# Patient Record
Sex: FEMALE | NOT HISPANIC OR LATINO | URBAN - METROPOLITAN AREA
[De-identification: names, ages, dates, MRNs, and addresses within clinical notes are randomized per-mention and may not be internally consistent; named-entity substitution may affect disease eponyms.]

---

## 2020-06-12 ENCOUNTER — ALLIED HEALTH/NURSE VISIT (OUTPATIENT)
Dept: PHARMACY | Facility: CLINIC | Age: 50
End: 2020-06-12
Payer: COMMERCIAL

## 2020-06-12 DIAGNOSIS — E78.5 HYPERLIPIDEMIA LDL GOAL <100: ICD-10-CM

## 2020-06-12 DIAGNOSIS — Z78.9 TAKES DIETARY SUPPLEMENTS: ICD-10-CM

## 2020-06-12 DIAGNOSIS — E11.9 TYPE 2 DIABETES MELLITUS WITHOUT COMPLICATION, WITH LONG-TERM CURRENT USE OF INSULIN (H): Primary | ICD-10-CM

## 2020-06-12 DIAGNOSIS — Z79.4 TYPE 2 DIABETES MELLITUS WITHOUT COMPLICATION, WITH LONG-TERM CURRENT USE OF INSULIN (H): Primary | ICD-10-CM

## 2020-06-12 PROCEDURE — 99605 MTMS BY PHARM NP 15 MIN: CPT | Performed by: PHARMACIST

## 2020-06-12 PROCEDURE — 99607 MTMS BY PHARM ADDL 15 MIN: CPT | Performed by: PHARMACIST

## 2020-06-12 RX ORDER — ALBUTEROL SULFATE 90 UG/1
2 AEROSOL, METERED RESPIRATORY (INHALATION) EVERY 6 HOURS PRN
COMMUNITY

## 2020-06-12 RX ORDER — ASPIRIN 81 MG/1
81 TABLET ORAL DAILY
COMMUNITY

## 2020-06-12 RX ORDER — ATORVASTATIN CALCIUM 10 MG/1
10 TABLET, FILM COATED ORAL DAILY
COMMUNITY

## 2020-06-12 RX ORDER — LIRAGLUTIDE 6 MG/ML
1.8 INJECTION SUBCUTANEOUS DAILY
COMMUNITY

## 2020-06-12 RX ORDER — INSULIN GLARGINE 100 [IU]/ML
10 INJECTION, SOLUTION SUBCUTANEOUS AT BEDTIME
COMMUNITY
End: 2020-06-12

## 2020-06-12 RX ORDER — FERROUS SULFATE 325(65) MG
325 TABLET ORAL
COMMUNITY

## 2020-06-12 NOTE — PROGRESS NOTES
MTM ENCOUNTER  SUBJECTIVE/OBJECTIVE:                           Bri Garcia is a 49 year old female coming in for an initial visit. She was referred to me from her P1 RN Case Manager. Pt's current PCP is Dr. Heaven Marquez [facility].      Patient consented to a telehealth visit: yes  Telemedicine Visit Details  Type of service:  Telephone visit  Start Time: 2:00 PM  End Time: 2:33 PM  Originating Location (pt. Location): Home  Distant Location (provider location):  [facility]TM  Mode of Communication:  Telephone    Chief Complaint: Medication costs.    Allergies/ADRs: Reviewed in EHR  Tobacco:  has no history on file for tobacco.  Alcohol: not currently using    Medication Adherence/Access: Pt has high deductible drug plan. Starts over every year in January.  She is paying close to $1000 per month for the first few months then it is about $100 per month. She does use a co-pay card for Victoza and has yet to obtain one for Lantus    Type 2 Diabetes:  Pt currently taking Victoza 1.8 mg daily, Lantus 10 units every evening, and metformin IR 1000 mg twice daily. Latest A1c was 8.0% (down from 8.6%). Pt is not experiencing side effects.  SMB time(s) daily. Ranges (patient reported): 120-180s mg/dL  Symptoms of low blood sugar? none  Symptoms of high blood sugar? 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 With recent changes to schedule due to pandemic she is   Aspirin: Taking 81mg daily and denies side effects  Statin: Yes: atorvastatin   ACEi/ARB: No.   Urine Albumin: No results found for: UMALCR     Hyperlipidemia: Current therapy includes atorvastatin 10 mg once daily.  Pt reports no significant myalgias or other side effects.    Supplements: Pt is taking vitamin D3 daily, ferrous sulfate 325 mg daily (pt did have hysterectomy this year for excessive bleeding). Denies any issues.     Today's Vitals: There were no vitals taken for  this visit. - telephone encounter, no vitals     ASSESSMENT:                              Medication Adherence: good, no issues identified    Type 2 Diabetes:  Needs improvement. Pt is not meeting A1c goal of <7%.  Pt would likely benefit from continuing to increase insulin along with PCP.  Given cost considerations patient should consider high-premium plan to reduce/spread out higher costs of brand name medications or may benefit from discussing change to Rybelsus (oral GLP-1 agonist) which has a $100 more benefit with it's co-pay assistance card.     Hyperlipidemia: Stable.     Supplements: Stable.     PLAN:                            1. Pt sent information for co-pay assistance for Lantus insulin - patient is scheduled to discuss increasing dose with PCP  2. Future considerations - Rybelsus? - co-pay assistance information sent to patient.     I spent 30 minutes with this patient today. I offer these suggestions for consideration by Dr. Collado. A copy of the visit note was provided to the patient's primary care provider.    Will follow up in 6 months or sooner if needed.    The patient was securely emailed a summary of these recommendations.     Mahad Goncalves, LilyD, BCACP  Medication Therapy Management Pharmacist  Pager: [phone]

## 2020-06-12 NOTE — PROGRESS NOTES
"MTM ENCOUNTER  SUBJECTIVE/OBJECTIVE:                           Bri Garcia is a 49 year old female coming in for an initial visit. She was referred to me from her P1 RN Case Manager.      Patient consented to a telehealth visit: yes  Telemedicine Visit Details  Type of service:  Telephone visit  Start Time: 2:00 PM  End Time: {video/phone visit end time:953216}  Originating Location (pt. Location): Home  Distant Location (provider location):  [facility]  Mode of Communication:  {telemedmtm2:060330::\"Telephone\"}    Chief Complaint: ***.  Personal Healthcare Goals: ***    Allergies/ADRs: {/3/:902992}  Tobacco:  has no history on file for tobacco.{Tobacco Cessation needed for ACO -- Delete if patient is a non-smoker:422554}  Alcohol: {ALCOHOL CONSUMPTION HX:794897}  Caffeine: {CAFFEINE INTAKE:728917}  Activity: ***  PMH: {/3/:770700}    Medication Adherence/Access: {fumedadherence:158752}    {DM Type:871349::\"Type 2 Diabetes\"}:  Pt currently taking Victoza 1.8 mg daily, Lantus 10 units every evening, metformin ***. {sideeffects:288250}  SMBG: {MTM self monitorin}. Ranges {Pt report:738472}: {bgranges:337308}  Symptoms of low blood sugar? {HYPOGLYCEMIA SYMPTOMS:546608::\"none\"}  Symptoms of high blood sugar? {diabetessymptoms:347276}  Eye exam: {up to date:629418}  Foot exam: {up to date:019928}  Diet/Exercise: ***  Aspirin: Taking 81mg daily { :030835}  Statin: {YES (EXPLAIN)/NO:668513}   ACEi/ARB: {YES (EXPLAIN)/NO:080181}.   Urine Albumin: No results found for: UMALCR   {IMMUNIZATION REMINDER LOOK IN NAVIGATOR:267652}    Hyperlipidemia: Current therapy includes atorvastatin 10 mg once daily.  Pt reports {mtmlipidsideeffect:091612}  {lipidascvd:583415}    ***: ***  ***: ***  ***: ***  ***: ***  ***: ***    Today's Vitals: There were no vitals taken for this visit.      ASSESSMENT:                          {mtmpartdquestion:316391}    Medication Adherence: " "{adherenceassess:369338}, {ADHERENCEOPTIONSASSES:971049}    ***: ***  ***: ***  ***: ***  ***: ***  ***: ***    PLAN:                          {Remind patient about MTM survey:049727}{DENISE?:906027}  ***    I spent {time:523931} with this patient today{MTMpartdbillingquestion:322714}. { :310078}. A copy of the visit note was provided to the patient's {ccd chart:567261} provider.    Will follow up in ***.    The patient {GIVEN/NOT GIVEN:662307::\"was given\"} a summary of these recommendations. {covisit:833065}    ***          "